# Patient Record
Sex: FEMALE | Race: WHITE | NOT HISPANIC OR LATINO | Employment: FULL TIME | URBAN - METROPOLITAN AREA
[De-identification: names, ages, dates, MRNs, and addresses within clinical notes are randomized per-mention and may not be internally consistent; named-entity substitution may affect disease eponyms.]

---

## 2017-12-04 ENCOUNTER — HOSPITAL ENCOUNTER (EMERGENCY)
Facility: HOSPITAL | Age: 37
Discharge: HOME | End: 2017-12-04
Attending: EMERGENCY MEDICINE

## 2017-12-04 VITALS
BODY MASS INDEX: 35.21 KG/M2 | TEMPERATURE: 98 F | OXYGEN SATURATION: 98 % | RESPIRATION RATE: 20 BRPM | DIASTOLIC BLOOD PRESSURE: 82 MMHG | SYSTOLIC BLOOD PRESSURE: 140 MMHG | HEIGHT: 72 IN | HEART RATE: 79 BPM | WEIGHT: 260 LBS

## 2017-12-04 DIAGNOSIS — J40 BRONCHITIS: Primary | ICD-10-CM

## 2017-12-04 DIAGNOSIS — M79.10 MYALGIA: ICD-10-CM

## 2017-12-04 DIAGNOSIS — R53.83 FATIGUE, UNSPECIFIED TYPE: ICD-10-CM

## 2017-12-04 PROCEDURE — 99283 EMERGENCY DEPT VISIT LOW MDM: CPT

## 2017-12-04 RX ORDER — DOXYCYCLINE 100 MG/1
100 CAPSULE ORAL 2 TIMES DAILY
Qty: 20 CAPSULE | Refills: 0 | Status: SHIPPED | OUTPATIENT
Start: 2017-12-04 | End: 2017-12-14

## 2017-12-04 RX ORDER — PREDNISONE 10 MG/1
10 TABLET ORAL DAILY
Qty: 21 TABLET | Refills: 0 | Status: SHIPPED | OUTPATIENT
Start: 2017-12-04 | End: 2017-12-25

## 2017-12-04 RX ORDER — PROMETHAZINE HYDROCHLORIDE AND CODEINE PHOSPHATE 6.25; 1 MG/5ML; MG/5ML
5 SOLUTION ORAL EVERY 4 HOURS PRN
Qty: 240 ML | Refills: 0 | Status: SHIPPED | OUTPATIENT
Start: 2017-12-04 | End: 2017-12-14

## 2017-12-04 NOTE — ED PROVIDER NOTES
"SCRIBE #1 NOTE: I, Corinne Francesco, am scribing for, and in the presence of, Shail Aguilar Jr., MD. I have scribed the entire note.      History      Chief Complaint   Patient presents with    Generalized Body Aches     Pt states, "I feel bad, hurting all over, coughing, congestion."       Review of patient's allergies indicates:  No Known Allergies     HPI   HPI    12/4/2017, 11:04 AM   History obtained from the patient      History of Present Illness: Karen Kapoor is a 37 y.o. female patient who presents to the Emergency Department for generalized myalgias which onset gradually a week ago. Symptoms are constant and moderate in severity. Pt reports her boss has been sick recently. No mitigating or exacerbating factors reported. Associated sxs include cough and fatigue. Patient denies any fever, chills, CP, SOB, congestion, rhinorrhea, N/V/D, back pain, neck pain, HA, dizziness, and all other sxs at this time. No prior Tx reported. Pt has Hx of bronchitis. No further complaints or concerns at this time.         Arrival mode: Personal vehicle      PCP: Primary Doctor No       Past Medical History:  History reviewed. No pertinent past medical history.    Past Surgical History:  Past Surgical History:   Procedure Laterality Date    HAND SURGERY           Family History:  History reviewed. No pertinent family history.    Social History:  Social History     Social History Main Topics    Smoking status: Never Smoker    Smokeless tobacco: Not on file    Alcohol use No    Drug use: No    Sexual activity: Not on file       ROS   Review of Systems   Constitutional: Positive for fatigue. Negative for chills and fever.   HENT: Negative for congestion, rhinorrhea and sore throat.    Respiratory: Positive for cough. Negative for shortness of breath.    Cardiovascular: Negative for chest pain and leg swelling.   Gastrointestinal: Negative for abdominal pain, diarrhea, nausea and vomiting.   Musculoskeletal: Positive for " myalgias (generalized). Negative for back pain, neck pain and neck stiffness.   Skin: Negative for rash and wound.   Neurological: Negative for dizziness, light-headedness, numbness and headaches.   All other systems reviewed and are negative.    Physical Exam      Initial Vitals [12/04/17 1042]   BP Pulse Resp Temp SpO2   (!) 140/82 79 20 98.3 °F (36.8 °C) 98 %      MAP       101.33          Physical Exam  Nursing Notes and Vital Signs Reviewed.  Constitutional: Patient is in no apparent distress. Well-developed and well-nourished.  Head: Atraumatic. Normocephalic.  Eyes: PERRL. EOM intact. Conjunctivae are not pale. No scleral icterus.  ENT: Mucous membranes are moist. Oropharynx is clear and symmetric.    Neck: Supple. Full ROM. No lymphadenopathy.  Cardiovascular: Regular rate. Regular rhythm. No murmurs, rubs, or gallops. Distal pulses are 2+ and symmetric.  Pulmonary/Chest: No respiratory distress. Clear to auscultation bilaterally. No wheezing or rales. Hacking, bronchitic cough. Hoarse.  Abdominal: Soft and non-distended.    Musculoskeletal: Moves all extremities. No obvious deformities.  Skin: Warm and dry.  Neurological:  Alert, awake, and appropriate.  Normal speech.  No acute focal neurological deficits are appreciated.  Psychiatric: Normal affect. Good eye contact. Appropriate in content.    ED Course    Procedures  ED Vital Signs:  Vitals:    12/04/17 1042   BP: (!) 140/82   Pulse: 79   Resp: 20   Temp: 98.3 °F (36.8 °C)   TempSrc: Oral   SpO2: 98%   Weight: 117.9 kg (260 lb)   Height: 6' (1.829 m)       Abnormal Lab Results:  Labs Reviewed - No data to display     All Lab Results:  None    Imaging Results:  Imaging Results    None                 The Emergency Provider reviewed the vital signs and test results, which are outlined above.    ED Discussion     11:06 AM: Pt is awake, alert, and in no distress. Discussed pt dx and plan of tx. Gave pt all f/u and return to the ED instructions. All questions  and concerns were addressed at this time. Pt expresses understanding of information and instructions, and is comfortable with plan to discharge. Pt is stable for discharge.    I discussed with patient and/or family/caretaker that evaluation in the ED does not suggest any emergent or life threatening medical conditions requiring immediate intervention beyond what was provided in the ED, and I believe patient is safe for discharge.  Regardless, an unremarkable evaluation in the ED does not preclude the development or presence of a serious of life threatening condition. As such, patient was instructed to return immediately for any worsening or change in current symptoms.      ED Medication(s):  Medications - No data to display    New Prescriptions    DOXYCYCLINE (VIBRAMYCIN) 100 MG CAP    Take 1 capsule (100 mg total) by mouth 2 (two) times daily.    PREDNISONE (DELTASONE) 10 MG TABLET    Take 1 tablet (10 mg total) by mouth once daily. Take 4 tabs x 3 days, then  Take 2 tabs x 3 days, then   Take 1 tab x 3 days.    PROMETHAZINE-CODEINE 6.25-10 MG/5 ML (PHENERGAN WITH CODEINE) 6.25-10 MG/5 ML SYRUP    Take 5 mLs by mouth every 4 (four) hours as needed for Cough.       Follow-up Information     [facility]. Call in 2 days.    Why:  As needed to schedule appt for recheck if symptoms are not improving  Contact information:  [address] [facility] [address] [phone]             [facility] - .    Specialty:  Emergency Medicine  Why:  As needed  Contact information:  [address] [facility] [address] [phone]                   Medical Decision Making              Scribe Attestation:   Scribe #1: I performed the above scribed service and the documentation accurately describes the services I performed. I attest to the accuracy of the note.    Attending:   Physician Attestation Statement for Scribe #1: I, Sahil Aguilar Jr., MD, personally performed the  services described in this documentation, as scribed by Corinne Mack, in my presence, and it is both accurate and complete.          Clinical Impression       ICD-10-CM ICD-9-CM   1. Bronchitis J40 490   2. Fatigue, unspecified type R53.83 780.79   3. Myalgia M79.1 729.1       Disposition:   Disposition: Discharged  Condition: Stable         Sahil Aguilar Jr., MD  12/04/17 8580

## 2018-01-01 ENCOUNTER — HOSPITAL ENCOUNTER (EMERGENCY)
Facility: HOSPITAL | Age: 38
Discharge: HOME | End: 2018-01-01
Payer: COMMERCIAL

## 2018-01-01 VITALS
BODY MASS INDEX: 34.4 KG/M2 | WEIGHT: 254 LBS | OXYGEN SATURATION: 98 % | SYSTOLIC BLOOD PRESSURE: 162 MMHG | HEART RATE: 76 BPM | HEIGHT: 72 IN | DIASTOLIC BLOOD PRESSURE: 100 MMHG | TEMPERATURE: 99 F | RESPIRATION RATE: 16 BRPM

## 2018-01-01 DIAGNOSIS — R05.9 COUGH: ICD-10-CM

## 2018-01-01 DIAGNOSIS — J40 BRONCHITIS: Primary | ICD-10-CM

## 2018-01-01 PROCEDURE — 99284 EMERGENCY DEPT VISIT MOD MDM: CPT

## 2018-01-01 RX ORDER — ALBUTEROL SULFATE 90 UG/1
1-2 AEROSOL, METERED RESPIRATORY (INHALATION) EVERY 6 HOURS PRN
Qty: 1 INHALER | Refills: 0 | Status: SHIPPED | OUTPATIENT
Start: 2018-01-01 | End: 2019-01-01

## 2018-01-01 RX ORDER — BENZONATATE 100 MG/1
100 CAPSULE ORAL 3 TIMES DAILY PRN
Qty: 20 CAPSULE | Refills: 0 | Status: SHIPPED | OUTPATIENT
Start: 2018-01-01 | End: 2018-01-11

## 2018-01-01 RX ORDER — AZITHROMYCIN 250 MG/1
250 TABLET, FILM COATED ORAL DAILY
Qty: 6 TABLET | Refills: 0 | Status: SHIPPED | OUTPATIENT
Start: 2018-01-01

## 2018-01-01 NOTE — ED PROVIDER NOTES
"SCRIBE #1 NOTE: I, Orlando West, am scribing for, and in the presence of, Sahil Gordon NP. I have scribed the entire note.      History      Chief Complaint   Patient presents with    Generalized Body Aches     cough, sore throat, feeling tired, unknown fever, started in November.       Review of patient's allergies indicates:  No Known Allergies     HPI   HPI    1/1/2018, 5:44 PM   History obtained from the patient      History of Present Illness: Karen Kapoor is a 37 y.o. female patient who presents to the Emergency Department for generalized body aches which onset gradually 1 month ago. Symptoms are constant and moderate in severity. Pt states "it feels like I've been hit by a truck." No mitigating or exacerbating factors reported. Associated sxs include nonproductive cough, lower back pain, congestion, and rhinorrhea. Patient denies any fever, chills, n/v/d, sore throat, wheezing, SOB, ear pain, trouble swallowing, and all other sxs at this time. Pt took Dayquil with no relief. No further complaints or concerns at this time.         Arrival mode: Personal vehicle     PCP: Primary Doctor No       Past Medical History:  Unknown    Past Surgical History:  Past Surgical History:   Procedure Laterality Date    HAND SURGERY           Family History:  Unknown    Social History:  Social History     Social History Main Topics    Smoking status: Never Smoker    Smokeless tobacco: Unknown    Alcohol use No    Drug use: No    Sexual activity: Unknown       ROS   Review of Systems   Constitutional: Negative for chills and fever.   HENT: Positive for congestion and rhinorrhea. Negative for ear pain, sore throat and trouble swallowing.    Respiratory: Positive for cough (nonproductive). Negative for shortness of breath and wheezing.    Cardiovascular: Negative for chest pain.   Gastrointestinal: Negative for diarrhea, nausea and vomiting.   Genitourinary: Negative for dysuria.   Musculoskeletal: Positive for " back pain (lower).        + generalized body aches   Skin: Negative for rash.   Neurological: Negative for weakness.   Hematological: Does not bruise/bleed easily.       Physical Exam      Initial Vitals [01/01/18 1723]   BP Pulse Resp Temp SpO2   (!) 162/100 76 16 98.8 °F (37.1 °C) 98 %      MAP       120.67          Physical Exam  Nursing Notes and Vital Signs Reviewed.  Constitutional: Patient is in no acute distress. Well-developed and well-nourished.  Head: Atraumatic. Normocephalic.  Eyes: PERRL. EOM intact. Conjunctivae are not pale. No scleral icterus.  Ears: Right TM normal. Left TM normal. No erythema. No bulging. No effusion or air-fluid levels. No perforation.   Nose: Patent nares. Turbinates are normal. Nasal drainage.   Throat: Moist mucous membranes. Posterior oropharynx is symmetric with erythema. Tonsillar exudate is not present. No trismus. Normal handling of secretions. No stridor.   Neck: Supple. Full ROM. No lymphadenopathy.  Cardiovascular: Regular rate. Regular rhythm. No murmurs, rubs, or gallops. Distal pulses are 2+ and symmetric.  Pulmonary/Chest: No respiratory distress. Clear to auscultation bilaterally. No wheezing, rales, or rhonchi.  Abdominal: Soft and non-distended.  There is no tenderness.  No rebound, guarding, or rigidity  Musculoskeletal: Moves all extremities. No obvious deformities. No edema.   Skin: Warm and dry.  Neurological:  Alert, awake, and appropriate.  Normal speech.  No acute focal neurological deficits are appreciated.  Psychiatric: Normal affect. Good eye contact. Appropriate in content.    ED Course    Procedures  ED Vital Signs:  Vitals:    01/01/18 1723   BP: (!) 162/100   Pulse: 76   Resp: 16   Temp: 98.8 °F (37.1 °C)   TempSrc: Oral   SpO2: 98%   Weight: 115.2 kg (253 lb 15.5 oz)   Height: 6' (1.829 m)           Imaging Results:  Imaging Results          X-Ray Chest PA And Lateral (Final result)  Result time 01/01/18 18:14:40    Final result by Roberto Carlos STEVENS  MD Mariusz (01/01/18 18:14:40)                 Impression:     Negative      Electronically signed by: FELIX MARR MD  Date:     01/01/18  Time:    18:14              Narrative:    History: Cough    Normal heart size. Clear lungs.                                      The Emergency Provider reviewed the vital signs and test results, which are outlined above.    ED Discussion     6:44 PM: Reassessed pt at this time.  Pt is awake, alert, and in no distress. Discussed with pt all pertinent ED information and results. Discussed pt dx and plan of tx. Gave pt all f/u and return to the ED instructions. All questions and concerns were addressed at this time. Pt expresses understanding of information and instructions, and is comfortable with plan to discharge. Pt is stable for discharge.    Patient presents with upper respiratory and flulike symptoms. Based on my assessment in the ED, I do not suspect any respiratory, airway, pulmonary, cardiovascular (including myocarditis), metabolic, CNS, medical, or surgical emergency medical condition. I have discussed with the patient and/or caregiver signs and symptoms for secondary bacterial infections, such as pneumonia. I believe that the patient's symptoms are most consistent with a viral illness, possibly influenza. Patient is safe for discharge home with conservative therapy.      ED Medication(s):  Medications - No data to display    Discharge Medication List as of 1/1/2018  6:47 PM      START taking these medications    Details   albuterol 90 mcg/actuation inhaler Inhale 1-2 puffs into the lungs every 6 (six) hours as needed for Wheezing or Shortness of Breath. Rescue, Starting Mon 1/1/2018, Until Tue 1/1/2019, Print      azithromycin (Z-LILIANA) 250 MG tablet Take 1 tablet (250 mg total) by mouth once daily. Take first 2 tablets together, then 1 every day until finished., Starting Mon 1/1/2018, Print      benzonatate (TESSALON) 100 MG capsule Take 1 capsule (100 mg total) by  mouth 3 (three) times daily as needed for Cough., Starting Mon 1/1/2018, Until Thu 1/11/2018, Print             Follow-up Information     [facility] In [facility] days.    Contact information:  [phone]           [facility] - .    Specialty:  Emergency Medicine  Why:  If symptoms worsen  Contact information:  [address]  [place] [address] [phone]                   Medical Decision Making    Medical Decision Making:   Clinical Tests:   Radiological Study: Ordered and Reviewed           Scribe Attestation:   Scribe #1: I performed the above scribed service and the documentation accurately describes the services I performed. I attest to the accuracy of the note.    Attending:   Physician Attestation Statement for Scribe #1: I, Sahil Gordon NP, personally performed the services described in this documentation, as scribed by Orlando West, in my presence, and it is both accurate and complete.          Clinical Impression       ICD-10-CM ICD-9-CM   1. Bronchitis J40 490   2. Cough R05 786.2       Disposition:   Disposition: Discharged  Condition: Stable           Sahil Gordon Jr., P  01/01/18 2158